# Patient Record
Sex: FEMALE | Race: WHITE | NOT HISPANIC OR LATINO | Employment: UNEMPLOYED | URBAN - METROPOLITAN AREA
[De-identification: names, ages, dates, MRNs, and addresses within clinical notes are randomized per-mention and may not be internally consistent; named-entity substitution may affect disease eponyms.]

---

## 2017-07-20 ENCOUNTER — TELEPHONE (OUTPATIENT)
Dept: CARDIOLOGY | Facility: CLINIC | Age: 77
End: 2017-07-20

## 2017-07-20 NOTE — TELEPHONE ENCOUNTER
I returned call to [facility]. Pt is new to [facility].   I offered appt at [facility] today for Dr Amezcua. She states she will call back

## 2017-07-20 NOTE — TELEPHONE ENCOUNTER
----- Message from Mavis Wyman sent at 7/20/2017 11:15 AM CDT -----  Contact: [facility]   NP- States would like the patient worked in for an appt on today for unspecified AFIB and fluid around heart. States they do not want to take the patient back to the ER. [facility] was advised of availability. Please adv/call [phone].//thanks. cw